# Patient Record
(demographics unavailable — no encounter records)

---

## 2019-10-19 NOTE — EDM.PDOC
ED HPI GENERAL MEDICAL PROBLEM





- General


Chief Complaint: Lower Extremity Injury/Pain


Stated Complaint: INJURY TO ANKLE


Time Seen by Provider: 10/19/19 16:20


Source of Information: Reports: Patient


History Limitations: Reports: No Limitations





- History of Present Illness


INITIAL COMMENTS - FREE TEXT/NARRATIVE: 





13-year-old female who was doing a leap practicing a dancing maneuver at about 

3 PM today and she fell into a drawer twisting her right foot inward. She had 

immediate pain in the top lateral, proximal aspect of her right foot and she 

has been unable to bear weight secondary to the pain. She rates pain as an 8/

10. It is a sharp pain. It is worse with palpation and with movement. She has 

normal sensation to her toes. She did not hit her head. There was no loss of 

consciousness. She has no neck or back pain. There were no other injuries. 

There are no open wounds. There are no other associated signs or symptoms. 

There are no other modifying factors.


Onset: Today (3 PM)


Duration: Constant


Location: Reports: Lower Extremity, Right (Foot)


Quality: Reports: Sharp


Severity: Moderate (8/10)


Improves with: Reports: Rest


Worsens with: Reports: Other (Palpation), Movement


Context: Reports: Trauma


Associated Symptoms: Reports: No Other Symptoms


Treatments PTA: Reports: Other (see below) (Nothing)





- Related Data


 Allergies











Allergy/AdvReac Type Severity Reaction Status Date / Time


 


No Known Allergies Allergy   Verified 10/19/19 16:07











Home Meds: 


 Home Meds





NK [No Known Home Meds]  10/19/19 [History]











Past Medical History


Psychiatric History: Reports: Other (See Below) (Sleep problems with insomnia 

for which she takes melatonin)





- Past Surgical History


Other Surgical History Comment: No previous surgeries.





Social & Family History





- Tobacco Use


Smoking Status *Q: Never Smoker


Second Hand Smoke Exposure: Yes





- Caffeine Use


Caffeine Use: Reports: None





- Recreational Drug Use


Recreational Drug Use: No





- Living Situation & Occupation


Living situation: Reports: with Family (She is here with her mother.)


Occupation: Student (She has an th7th thgthrthathdthethrth.)





Review of Systems





- Review of Systems


Review Of Systems: See Below


Constitutional: Reports: No Symptoms


Eyes: Reports: No Symptoms


Ears: Reports: No Symptoms


Nose: Reports: No Symptoms


Mouth/Throat: Reports: No Symptoms


Respiratory: Reports: No Symptoms


Cardiovascular: Reports: No Symptoms


GI/Abdominal: Reports: No Symptoms


Genitourinary: Reports: No Symptoms


Musculoskeletal: Reports: Foot Pain (Right foot pain status post injury)


Skin: Reports: No Symptoms


Neurological: Reports: No Symptoms





ED EXAM, GENERAL





- Physical Exam


Exam: See Below


Exam Limited By: No Limitations


General Appearance: Alert, WD/WN, Mild Distress


Eye Exam: Bilateral Eye: EOMI, Normal Inspection


Ears: Normal External Exam, Hearing Grossly Normal


Ear Exam: Bilateral Ear: Auricle Normal


Nose: Normal Inspection, Normal Mucosa, No Blood


Throat/Mouth: Normal Inspection, Normal Lips, Normal Oropharynx, Normal Voice, 

No Airway Compromise


Head: Atraumatic, Normocephalic


Neck: Normal Inspection, Supple, Non-Tender, Full Range of Motion


Respiratory/Chest: No Respiratory Distress, Lungs Clear, Normal Breath Sounds, 

No Accessory Muscle Use, Chest Non-Tender


Cardiovascular: Normal Peripheral Pulses, Regular Rate, Rhythm, No JVD


Peripheral Pulses: 2+: Dorsalis Pedis (R)


GI/Abdominal: Normal Bowel Sounds, Soft, Non-Tender, No Mass


Back Exam: Normal Inspection


Extremities: No Pedal Edema, Normal Capillary Refill, Other (Tender with 

ecchymosis over the proximal, lateral top of her right foot.)


Neurological: Alert, Oriented, CN II-XII Intact, Normal Cognition, No Motor/

Sensory Deficits


Skin Exam: Warm, Dry, Intact, Normal Color, No Rash





Course





- Vital Signs


Last Recorded V/S: 


 Last Vital Signs











Temp  36.4 C   10/19/19 15:52


 


Pulse  94 H  10/19/19 15:52


 


Resp  18 H  10/19/19 15:52


 


BP  129/80   10/19/19 15:52


 


Pulse Ox  98   10/19/19 15:52














- Orders/Labs/Meds


Orders: 


 Active Orders 24 hr











 Category Date Time Status


 


 Foot Comp Min 3V Rt [CR] Stat Exams  10/19/19 16:07 Taken














- Radiology Interpretation


Free Text/Narrative:: 





X-ray of right foot shows no definite fracture.





- Re-Assessments/Exams


Free Text/Narrative Re-Assessment/Exam: 





10/19/19 16:54: The right foot x-ray shows no definite fracture. He appears to 

have a sprain of her right foot. They do have crutches at home and I have the 

nursing staff place an Ace wrap to her right foot and ankle. She is to use the 

crutches for nonweightbearing for a few days but begin bearing weight as 

tolerated she is also to to motion with her foot and she may take Tylenol and 

ibuprofen for pain as needed I discussed the findings of the x-ray with the 

mother and the patient and they are in agreement with the plan for discharge.








Departure





- Departure


Time of Disposition: 16:57


Disposition: Home, Self-Care 01


Condition: Good (Stable)


Clinical Impression: 


Sprain of right foot


Qualifiers:


 Encounter type: initial encounter Qualified Code(s): S93.601A - Unspecified 

sprain of right foot, initial encounter








- Discharge Information


Instructions:  Crutch Use, Adult, Easy-to-Read, Muscle Strain, Easy-to-Read


Referrals: 


Nora Ambrose NP [Primary Care Provider] - 


Forms:  ED Department Discharge


Additional Instructions: 


The x-ray of your child's right foot showed no fracture. She appears to have a 

sprain to her right lateral foot. Apply ice packs intermittently for the next 

few days. Elevate her right foot often over the next few days. Use crutches 

with no weightbearing on the right foot initially and begin bearing weight as 

tolerated. You range of motion exercises with your foot as you were shown in 

the emergency department. You may take ibuprofen and Tylenol as needed for 

pain. Back to the emergency department for marked increase in pain, redness, 

increased swelling or any other concerning sign or symptom.





- My Orders


Last 24 Hours: 


My Active Orders





10/19/19 16:07


Foot Comp Min 3V Rt [CR] Stat 














- Assessment/Plan


Last 24 Hours: 


My Active Orders





10/19/19 16:07


Foot Comp Min 3V Rt [CR] Stat

## 2019-10-22 NOTE — CR
INDICATION:  Pain after landing on foot wrong, pain laterally.



RIGHT FOOT:  Three views of the right foot revealed no evidence of a fracture, 
dislocation, or other significant bone or joint abnormality.  



If symptoms persist - if occult fracture site is suspected clinically, re-
examination in 10-14 days may be helpful.

MTDD

## 2021-05-28 NOTE — EDM.PDOC
ED HPI GENERAL MEDICAL PROBLEM





- General


Chief Complaint: Cardiovascular Problem


Stated Complaint: SOB/TIGHTNESS IN CHEST


Time Seen by Provider: 05/28/21 18:45


Source of Information: Reports: Patient


History Limitations: Reports: No Limitations





- History of Present Illness


INITIAL COMMENTS - FREE TEXT/NARRATIVE: 





c/o chest wall pain





lives with mother, no one else at home





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 

event





had soreness at costochondral junction this AM, took ibuprofen x 2 at 7:30a and 

pain went away, at 10a in math class he had additional pain, took ibuprofen x 4 

in early afternoon that did not help





went to walk-in clinic this afternoon and told to come to ED if pain persisted





here with mother, typical costochondritis which was discussed


  ** CHEST


Pain Score (Numeric/FACES): 7





- Related Data


                                    Allergies











Allergy/AdvReac Type Severity Reaction Status Date / Time


 


No Known Allergies Allergy   Verified 05/28/21 18:53











Home Meds: 


                                    Home Meds





FLUoxetine [PROzac] 20 mg PO DAILY 05/28/21 [History]


traZODone 50 mg PO BEDTIME PRN 05/28/21 [History]











Past Medical History





- Past Health History


Medical/Surgical History: Denies Medical/Surgical History


Psychiatric History: Reports: Other (See Below) (Sleep problems with insomnia 

for which she takes melatonin)





- Past Surgical History


Other Surgical History Comment: No previous surgeries.





Social & Family History





- Family History


Family Medical History: No Pertinent Family History





- Caffeine Use


Caffeine Use: Reports: None





- Living Situation & Occupation


Living situation: Reports: with Family (She is here with her mother.)


Occupation: Student (She has an th7th thgthrthathdthethrth.)





ED ROS GENERAL





- Review of Systems


Review Of Systems: See Below


Constitutional: Reports: No Symptoms


HEENT: Reports: No Symptoms


Respiratory: Reports: No Symptoms


Cardiovascular: Reports: Chest Pain


Endocrine: Reports: No Symptoms


GI/Abdominal: Reports: No Symptoms


: Reports: No Symptoms


Musculoskeletal: Reports: No Symptoms


Skin: Reports: No Symptoms


Neurological: Reports: No Symptoms


Psychiatric: Reports: No Symptoms


Hematologic/Lymphatic: Reports: No Symptoms


Immunologic: Reports: No Symptoms





ED EXAM, GENERAL





- Physical Exam


Exam: See Below


Exam Limited By: No Limitations


General Appearance: Alert, WD/WN


Nose: Normal Inspection


Throat/Mouth: Normal Inspection


Head: Atraumatic, Normocephalic


Neck: Normal Inspection, Supple, Non-Tender


Respiratory/Chest: No Respiratory Distress, Lungs Clear, Normal Breath Sounds, 

No Accessory Muscle Use, Other (holding hand against upper anterior chest and 

rubbing it, 1-2+ tender with wincing and withdrawal to palpation of the CC 

junction of the upper 4-5 ribs b/l, lungs CTA, no wince with deep breath, BS 

symmetric).  No: Respiratory Distress, Crackles, Rales, Rhonchi, Wheezing


Cardiovascular: Regular Rate, Rhythm, No Edema, No Gallop, No Murmur, No Rub


GI/Abdominal: Normal Bowel Sounds, Soft, Non-Tender, No Distention


Back Exam: Normal Inspection, Full Range of Motion, NT


Extremities: Normal Inspection, Non-Tender, No Pedal Edema


Neurological: Alert, Oriented, CN II-XII Intact, Normal Cognition, No 

Motor/Sensory Deficits


Psychiatric: Normal Affect, Normal Mood


Skin Exam: Warm, Dry, Intact, Normal Color, No Rash


Lymphatic: No Adenopathy





Course





- Vital Signs


Last Recorded V/S: 


                                Last Vital Signs











Temp  36.9 C   05/28/21 18:40


 


Pulse  68   05/28/21 18:40


 


Resp  16   05/28/21 18:40


 


BP  118/65   05/28/21 18:40


 


Pulse Ox  99   05/28/21 18:40














Departure





- Departure


Time of Disposition: 19:15


Disposition: Home, Self-Care 01


Condition: Good


Clinical Impression: 


 Acute costochondritis





Instructions:  Costochondritis


Referrals: 


Nora Ambrose NP [Primary Care Provider] - 


Forms:  ED Department Discharge


Additional Instructions: 


For pain and inflammation, take ibuprofen 200 mg 3 tabs and acetaminophen 325 mg

2 tabs 4 times a day for 2 days, longer if needed.





Use ice for 10 minutes to anterior chest wall every 1-2 hours while awake as 

needed.





May continue usual activities. However, do not lift over 5 pounds for the next 

48 hours.





See your doctor in 3 days if you are not pain free.





Sepsis Event Note (ED)





- Focused Exam


Vital Signs: 


                                   Vital Signs











  Temp Pulse Resp BP Pulse Ox


 


 05/28/21 18:40  36.9 C  68  16  118/65  99